# Patient Record
Sex: FEMALE | Race: BLACK OR AFRICAN AMERICAN | NOT HISPANIC OR LATINO | URBAN - METROPOLITAN AREA
[De-identification: names, ages, dates, MRNs, and addresses within clinical notes are randomized per-mention and may not be internally consistent; named-entity substitution may affect disease eponyms.]

---

## 2022-11-15 ENCOUNTER — OFFICE (OUTPATIENT)
Dept: URBAN - METROPOLITAN AREA CLINIC 12 | Facility: CLINIC | Age: 67
End: 2022-11-15

## 2022-11-15 DIAGNOSIS — R00.0 TACHYCARDIA, UNSPECIFIED: ICD-10-CM

## 2022-11-15 DIAGNOSIS — Z79.01 LONG TERM (CURRENT) USE OF ANTICOAGULANTS: ICD-10-CM

## 2022-11-15 DIAGNOSIS — R19.5 OTHER FECAL ABNORMALITIES: ICD-10-CM

## 2022-11-15 DIAGNOSIS — I48.91 UNSPECIFIED ATRIAL FIBRILLATION: ICD-10-CM

## 2022-11-15 PROCEDURE — 99204 OFFICE O/P NEW MOD 45 MIN: CPT | Performed by: INTERNAL MEDICINE

## 2022-11-15 NOTE — SERVICENOTES
Given the patient's occult blood positive stool I did recommend that she undergo diagnostic colonoscopy as well as EGD to rule out any peptic ulcer disease, gastritis colon polyps, ulcer, vascular malformations, etc.  The patient is on long-term anticoagulation and so will need to stop Eliquis two days prior to procedure. We will need a obtain clearance from a cardiologist given her age fibrillation with tachycardia as it does not appear that her pulse is under control at this point and she is not on a beta-blocker.  She is willing to see Cardiology here in [facility].  We will place a referral and schedule procedures once we obtain cardiac clearance.  She was otherwise instructed notify us if she has any new or worrisome symptoms in the meantime.

## 2022-11-15 NOTE — SERVICEHPINOTES
Ms. Alatorre is a 67-year-old female here for evaluation for occult blood positive stool. She recently had stool checked by her PCP which was positive for occult blood. She denies any issues with fevers, chills, nausea, vomiting, abdominal pain, diarrhea, constipation, rectal bleeding, or dysphagia. There is no first-degree family history of colon cancer or colon polyps. She is average risk for colon cancer standpoint. She has never had a colonoscopy. [facility] and states that she was initially placed on metoprolol and Coumadin but stopped these medications because she did not like taking them. She did have a stroke in January 2022 and was seen at [facility]. She is doing much better from a stroke standpoint but is now on Eliquis. She has not seen a cardiologist in five years and states that her pulse is usually around 90s over 100. She does note some issues with anxiety and heart palpitations. She has never had a heart attack and denies any chest pain or shortness of breath. CBC by her PCP did not show anemia.

## 2023-01-26 VITALS
HEART RATE: 79 BPM | SYSTOLIC BLOOD PRESSURE: 156 MMHG | DIASTOLIC BLOOD PRESSURE: 93 MMHG | OXYGEN SATURATION: 98 % | HEIGHT: 66 IN

## 2023-02-03 ENCOUNTER — OFFICE (OUTPATIENT)
Dept: URBAN - METROPOLITAN AREA PATHOLOGY 22 | Facility: PATHOLOGY | Age: 68
End: 2023-02-03
Payer: COMMERCIAL

## 2023-02-03 ENCOUNTER — AMBULATORY SURGICAL CENTER (OUTPATIENT)
Dept: URBAN - METROPOLITAN AREA SURGERY 3 | Facility: SURGERY | Age: 68
End: 2023-02-03

## 2023-02-03 ENCOUNTER — AMBULATORY SURGICAL CENTER (OUTPATIENT)
Dept: URBAN - METROPOLITAN AREA SURGERY 3 | Facility: SURGERY | Age: 68
End: 2023-02-03
Payer: COMMERCIAL

## 2023-02-03 VITALS
TEMPERATURE: 98 F | DIASTOLIC BLOOD PRESSURE: 93 MMHG | OXYGEN SATURATION: 97 % | SYSTOLIC BLOOD PRESSURE: 164 MMHG | RESPIRATION RATE: 18 BRPM | HEART RATE: 82 BPM | DIASTOLIC BLOOD PRESSURE: 79 MMHG | DIASTOLIC BLOOD PRESSURE: 96 MMHG | HEART RATE: 76 BPM | SYSTOLIC BLOOD PRESSURE: 144 MMHG | SYSTOLIC BLOOD PRESSURE: 164 MMHG | RESPIRATION RATE: 19 BRPM | SYSTOLIC BLOOD PRESSURE: 120 MMHG | HEART RATE: 82 BPM | RESPIRATION RATE: 19 BRPM | HEART RATE: 106 BPM | TEMPERATURE: 97.2 F | HEART RATE: 106 BPM | DIASTOLIC BLOOD PRESSURE: 83 MMHG | HEART RATE: 76 BPM | RESPIRATION RATE: 22 BRPM | OXYGEN SATURATION: 96 % | RESPIRATION RATE: 21 BRPM | HEART RATE: 86 BPM | SYSTOLIC BLOOD PRESSURE: 141 MMHG | TEMPERATURE: 98 F | TEMPERATURE: 97.2 F | HEART RATE: 76 BPM | SYSTOLIC BLOOD PRESSURE: 144 MMHG | DIASTOLIC BLOOD PRESSURE: 89 MMHG | RESPIRATION RATE: 20 BRPM | SYSTOLIC BLOOD PRESSURE: 120 MMHG | RESPIRATION RATE: 21 BRPM | HEART RATE: 86 BPM | DIASTOLIC BLOOD PRESSURE: 79 MMHG | HEART RATE: 86 BPM | DIASTOLIC BLOOD PRESSURE: 89 MMHG | DIASTOLIC BLOOD PRESSURE: 93 MMHG | OXYGEN SATURATION: 97 % | HEART RATE: 78 BPM | RESPIRATION RATE: 22 BRPM | HEIGHT: 66 IN | RESPIRATION RATE: 20 BRPM | SYSTOLIC BLOOD PRESSURE: 144 MMHG | OXYGEN SATURATION: 100 % | OXYGEN SATURATION: 100 % | OXYGEN SATURATION: 100 % | DIASTOLIC BLOOD PRESSURE: 96 MMHG | RESPIRATION RATE: 18 BRPM | RESPIRATION RATE: 22 BRPM | DIASTOLIC BLOOD PRESSURE: 83 MMHG | TEMPERATURE: 98 F | OXYGEN SATURATION: 98 % | SYSTOLIC BLOOD PRESSURE: 141 MMHG | DIASTOLIC BLOOD PRESSURE: 96 MMHG | HEART RATE: 106 BPM | OXYGEN SATURATION: 97 % | OXYGEN SATURATION: 96 % | HEIGHT: 66 IN | DIASTOLIC BLOOD PRESSURE: 83 MMHG | RESPIRATION RATE: 18 BRPM | WEIGHT: 202 LBS | TEMPERATURE: 97.2 F | SYSTOLIC BLOOD PRESSURE: 164 MMHG | HEART RATE: 78 BPM | OXYGEN SATURATION: 98 % | RESPIRATION RATE: 19 BRPM | DIASTOLIC BLOOD PRESSURE: 89 MMHG | HEART RATE: 78 BPM | DIASTOLIC BLOOD PRESSURE: 93 MMHG | RESPIRATION RATE: 20 BRPM | HEART RATE: 82 BPM | OXYGEN SATURATION: 98 % | WEIGHT: 202 LBS | SYSTOLIC BLOOD PRESSURE: 120 MMHG | RESPIRATION RATE: 21 BRPM | WEIGHT: 202 LBS | OXYGEN SATURATION: 96 % | DIASTOLIC BLOOD PRESSURE: 79 MMHG | HEIGHT: 66 IN | SYSTOLIC BLOOD PRESSURE: 141 MMHG

## 2023-02-03 DIAGNOSIS — D12.3 BENIGN NEOPLASM OF TRANSVERSE COLON: ICD-10-CM

## 2023-02-03 DIAGNOSIS — K29.50 UNSPECIFIED CHRONIC GASTRITIS WITHOUT BLEEDING: ICD-10-CM

## 2023-02-03 DIAGNOSIS — D12.4 BENIGN NEOPLASM OF DESCENDING COLON: ICD-10-CM

## 2023-02-03 DIAGNOSIS — D12.2 BENIGN NEOPLASM OF ASCENDING COLON: ICD-10-CM

## 2023-02-03 DIAGNOSIS — D12.0 BENIGN NEOPLASM OF CECUM: ICD-10-CM

## 2023-02-03 DIAGNOSIS — B96.81 HELICOBACTER PYLORI [H. PYLORI] AS THE CAUSE OF DISEASES CLA: ICD-10-CM

## 2023-02-03 DIAGNOSIS — K21.00 GASTRO-ESOPHAGEAL REFLUX DISEASE WITH ESOPHAGITIS, WITHOUT B: ICD-10-CM

## 2023-02-03 DIAGNOSIS — Z12.11 ENCOUNTER FOR SCREENING FOR MALIGNANT NEOPLASM OF COLON: ICD-10-CM

## 2023-02-03 PROBLEM — K22.89 OTHER SPECIFIED DISEASE OF ESOPHAGUS: Status: ACTIVE | Noted: 2023-02-03

## 2023-02-03 PROBLEM — K31.89 OTHER DISEASES OF STOMACH AND DUODENUM: Status: ACTIVE | Noted: 2023-02-03

## 2023-02-03 PROBLEM — K64.1 SECOND DEGREE HEMORRHOIDS: Status: ACTIVE | Noted: 2023-02-03

## 2023-02-03 PROBLEM — K62.89 OTHER SPECIFIED DISEASES OF ANUS AND RECTUM: Status: ACTIVE | Noted: 2023-02-03

## 2023-02-03 PROCEDURE — 43239 EGD BIOPSY SINGLE/MULTIPLE: CPT | Mod: 51 | Performed by: INTERNAL MEDICINE

## 2023-02-03 PROCEDURE — 45380 COLONOSCOPY AND BIOPSY: CPT | Mod: 59,PT,KX | Performed by: INTERNAL MEDICINE

## 2023-02-03 PROCEDURE — 45385 COLONOSCOPY W/LESION REMOVAL: CPT | Mod: PT,KX | Performed by: INTERNAL MEDICINE

## 2023-02-03 PROCEDURE — 45385 COLONOSCOPY W/LESION REMOVAL: CPT | Mod: KX,PT | Performed by: INTERNAL MEDICINE

## 2023-02-03 PROCEDURE — 45380 COLONOSCOPY AND BIOPSY: CPT | Mod: 59,KX,PT | Performed by: INTERNAL MEDICINE

## 2023-02-03 PROCEDURE — 45380 COLONOSCOPY AND BIOPSY: CPT | Mod: KX,PT,59 | Performed by: INTERNAL MEDICINE

## 2023-02-03 RX ORDER — PANTOPRAZOLE SODIUM 20 MG/1
20 TABLET, DELAYED RELEASE ORAL
Qty: 30 | Refills: 11 | Status: COMPLETED
Start: 2023-02-03 | End: 2023-08-01

## 2023-02-08 ENCOUNTER — INPATIENT HOSPITAL (OUTPATIENT)
Dept: URBAN - METROPOLITAN AREA HOSPITAL 130 | Facility: HOSPITAL | Age: 68
End: 2023-02-08
Payer: OTHER GOVERNMENT

## 2023-02-08 DIAGNOSIS — K92.1 MELENA: ICD-10-CM

## 2023-02-08 PROCEDURE — 99254 IP/OBS CNSLTJ NEW/EST MOD 60: CPT | Performed by: INTERNAL MEDICINE

## 2023-02-09 ENCOUNTER — INPATIENT HOSPITAL (OUTPATIENT)
Dept: URBAN - METROPOLITAN AREA HOSPITAL 130 | Facility: HOSPITAL | Age: 68
End: 2023-02-09
Payer: OTHER GOVERNMENT

## 2023-02-09 DIAGNOSIS — K92.1 MELENA: ICD-10-CM

## 2023-02-09 DIAGNOSIS — K91.840 POSTPROCEDURAL HEMORRHAGE OF A DIGESTIVE SYSTEM ORGAN OR STR: ICD-10-CM

## 2023-02-09 PROCEDURE — 45382 COLONOSCOPY W/CONTROL BLEED: CPT | Performed by: INTERNAL MEDICINE

## 2023-02-10 ENCOUNTER — INPATIENT HOSPITAL (OUTPATIENT)
Dept: URBAN - METROPOLITAN AREA HOSPITAL 130 | Facility: HOSPITAL | Age: 68
End: 2023-02-10
Payer: OTHER GOVERNMENT

## 2023-02-10 DIAGNOSIS — K92.1 MELENA: ICD-10-CM

## 2023-02-10 PROCEDURE — 99233 SBSQ HOSP IP/OBS HIGH 50: CPT | Performed by: INTERNAL MEDICINE

## 2023-02-11 ENCOUNTER — INPATIENT HOSPITAL (OUTPATIENT)
Dept: URBAN - METROPOLITAN AREA HOSPITAL 130 | Facility: HOSPITAL | Age: 68
End: 2023-02-11
Payer: OTHER GOVERNMENT

## 2023-02-11 DIAGNOSIS — I48.91 UNSPECIFIED ATRIAL FIBRILLATION: ICD-10-CM

## 2023-02-11 DIAGNOSIS — K91.89 OTHER POSTPROCEDURAL COMPLICATIONS AND DISORDERS OF DIGESTIV: ICD-10-CM

## 2023-02-11 DIAGNOSIS — K92.1 MELENA: ICD-10-CM

## 2023-02-11 DIAGNOSIS — Z79.01 LONG TERM (CURRENT) USE OF ANTICOAGULANTS: ICD-10-CM

## 2023-02-11 PROCEDURE — 99232 SBSQ HOSP IP/OBS MODERATE 35: CPT | Performed by: INTERNAL MEDICINE

## 2023-02-13 ENCOUNTER — INPATIENT HOSPITAL (OUTPATIENT)
Dept: URBAN - METROPOLITAN AREA HOSPITAL 130 | Facility: HOSPITAL | Age: 68
End: 2023-02-13
Payer: OTHER GOVERNMENT

## 2023-02-13 DIAGNOSIS — K91.89 OTHER POSTPROCEDURAL COMPLICATIONS AND DISORDERS OF DIGESTIV: ICD-10-CM

## 2023-02-13 PROCEDURE — 99231 SBSQ HOSP IP/OBS SF/LOW 25: CPT | Performed by: INTERNAL MEDICINE

## 2023-03-02 ENCOUNTER — OFFICE (OUTPATIENT)
Dept: URBAN - METROPOLITAN AREA CLINIC 14 | Facility: CLINIC | Age: 68
End: 2023-03-02
Payer: MEDICAID

## 2023-03-02 VITALS
OXYGEN SATURATION: 99 % | RESPIRATION RATE: 18 BRPM | SYSTOLIC BLOOD PRESSURE: 154 MMHG | HEART RATE: 98 BPM | WEIGHT: 211 LBS | HEIGHT: 66 IN | DIASTOLIC BLOOD PRESSURE: 94 MMHG

## 2023-03-02 DIAGNOSIS — B96.81 HELICOBACTER PYLORI [H. PYLORI] AS THE CAUSE OF DISEASES CLA: ICD-10-CM

## 2023-03-02 DIAGNOSIS — K29.50 UNSPECIFIED CHRONIC GASTRITIS WITHOUT BLEEDING: ICD-10-CM

## 2023-03-02 DIAGNOSIS — Z86.010 PERSONAL HISTORY OF COLONIC POLYPS: ICD-10-CM

## 2023-03-02 DIAGNOSIS — Z79.01 LONG TERM (CURRENT) USE OF ANTICOAGULANTS: ICD-10-CM

## 2023-03-02 PROBLEM — K63.5 POLYP OF COLON: Status: ACTIVE | Noted: 2023-02-03

## 2023-03-02 PROCEDURE — 99212 OFFICE O/P EST SF 10 MIN: CPT | Performed by: INTERNAL MEDICINE

## 2023-03-03 LAB
CBC, PLATELET, NO DIFFERENTIAL: HEMATOCRIT: 34 % (ref 34–46.6)
CBC, PLATELET, NO DIFFERENTIAL: HEMOGLOBIN: 10 G/DL — LOW (ref 11.1–15.9)
CBC, PLATELET, NO DIFFERENTIAL: MCH: 26.6 PG (ref 26.6–33)
CBC, PLATELET, NO DIFFERENTIAL: MCHC: 29.4 G/DL — LOW (ref 31.5–35.7)
CBC, PLATELET, NO DIFFERENTIAL: MCV: 90 FL (ref 79–97)
CBC, PLATELET, NO DIFFERENTIAL: PLATELETS: 280 X10E3/UL (ref 150–450)
CBC, PLATELET, NO DIFFERENTIAL: RBC: 3.76 X10E6/UL — LOW (ref 3.77–5.28)
CBC, PLATELET, NO DIFFERENTIAL: RDW: 16 % — HIGH (ref 11.7–15.4)
CBC, PLATELET, NO DIFFERENTIAL: WBC: 4 X10E3/UL (ref 3.4–10.8)
FE+TIBC+FER: FERRITIN: 58 NG/ML (ref 15–150)
FE+TIBC+FER: IRON BIND.CAP.(TIBC): 334 UG/DL (ref 250–450)
FE+TIBC+FER: IRON SATURATION: 12 % — LOW (ref 15–55)
FE+TIBC+FER: IRON: 41 UG/DL (ref 27–139)
FE+TIBC+FER: UIBC: 293 UG/DL (ref 118–369)

## 2023-03-09 PROBLEM — R19.5 PRESENCE OF FECAL OCCULT BLOOD: Status: ACTIVE | Noted: 2023-02-03

## 2023-03-29 LAB
H PYLORI BREATH TEST: NEGATIVE
H. PYLORI BREATH COLLECTION: (no result)

## 2023-08-01 ENCOUNTER — OFFICE (OUTPATIENT)
Dept: URBAN - METROPOLITAN AREA CLINIC 12 | Facility: CLINIC | Age: 68
End: 2023-08-01
Payer: MEDICAID

## 2023-08-01 VITALS
DIASTOLIC BLOOD PRESSURE: 85 MMHG | OXYGEN SATURATION: 98 % | WEIGHT: 200 LBS | SYSTOLIC BLOOD PRESSURE: 137 MMHG | HEART RATE: 70 BPM | HEIGHT: 65 IN

## 2023-08-01 DIAGNOSIS — I48.91 UNSPECIFIED ATRIAL FIBRILLATION: ICD-10-CM

## 2023-08-01 DIAGNOSIS — K29.70 GASTRITIS, UNSPECIFIED, WITHOUT BLEEDING: ICD-10-CM

## 2023-08-01 DIAGNOSIS — D50.9 IRON DEFICIENCY ANEMIA, UNSPECIFIED: ICD-10-CM

## 2023-08-01 DIAGNOSIS — Z79.01 LONG TERM (CURRENT) USE OF ANTICOAGULANTS: ICD-10-CM

## 2023-08-01 PROCEDURE — 99213 OFFICE O/P EST LOW 20 MIN: CPT | Performed by: INTERNAL MEDICINE

## 2023-08-02 LAB
CBC, PLATELET, NO DIFFERENTIAL: HEMATOCRIT: 41.4 % (ref 34–46.6)
CBC, PLATELET, NO DIFFERENTIAL: HEMOGLOBIN: 12.1 G/DL (ref 11.1–15.9)
CBC, PLATELET, NO DIFFERENTIAL: MCH: 25.6 PG — LOW (ref 26.6–33)
CBC, PLATELET, NO DIFFERENTIAL: MCHC: 29.2 G/DL — LOW (ref 31.5–35.7)
CBC, PLATELET, NO DIFFERENTIAL: MCV: 88 FL (ref 79–97)
CBC, PLATELET, NO DIFFERENTIAL: PLATELETS: 165 X10E3/UL (ref 150–450)
CBC, PLATELET, NO DIFFERENTIAL: RBC: 4.72 X10E6/UL (ref 3.77–5.28)
CBC, PLATELET, NO DIFFERENTIAL: RDW: 15.6 % — HIGH (ref 11.7–15.4)
CBC, PLATELET, NO DIFFERENTIAL: WBC: 3.3 X10E3/UL — LOW (ref 3.4–10.8)
FE+TIBC+FER: FERRITIN: 139 NG/ML (ref 15–150)
FE+TIBC+FER: IRON BIND.CAP.(TIBC): 332 UG/DL (ref 250–450)
FE+TIBC+FER: IRON SATURATION: 21 % (ref 15–55)
FE+TIBC+FER: IRON: 70 UG/DL (ref 27–139)
FE+TIBC+FER: UIBC: 262 UG/DL (ref 118–369)

## 2024-07-23 ENCOUNTER — OFFICE (OUTPATIENT)
Dept: URBAN - METROPOLITAN AREA CLINIC 11 | Facility: CLINIC | Age: 69
End: 2024-07-23
Payer: MEDICAID

## 2024-07-23 VITALS
SYSTOLIC BLOOD PRESSURE: 146 MMHG | OXYGEN SATURATION: 100 % | WEIGHT: 229 LBS | DIASTOLIC BLOOD PRESSURE: 98 MMHG | HEART RATE: 71 BPM | DIASTOLIC BLOOD PRESSURE: 81 MMHG | SYSTOLIC BLOOD PRESSURE: 135 MMHG | HEIGHT: 65 IN

## 2024-07-23 DIAGNOSIS — K86.89 OTHER SPECIFIED DISEASES OF PANCREAS: ICD-10-CM

## 2024-07-23 PROCEDURE — 99213 OFFICE O/P EST LOW 20 MIN: CPT

## 2024-08-23 PROBLEM — K86.89 OTHER SPECIFIED DISEASES OF PANCREAS: Status: ACTIVE | Noted: 2024-08-23
